# Patient Record
Sex: FEMALE | Race: WHITE | Employment: FULL TIME | URBAN - METROPOLITAN AREA
[De-identification: names, ages, dates, MRNs, and addresses within clinical notes are randomized per-mention and may not be internally consistent; named-entity substitution may affect disease eponyms.]

---

## 2020-06-01 ENCOUNTER — TELEPHONE (OUTPATIENT)
Dept: ENDOCRINOLOGY | Age: 40
End: 2020-06-01

## 2020-06-08 NOTE — TELEPHONE ENCOUNTER
Pt called and stated she mailed in NP paperwork last week but received another packet this past Friday (6/5/2020). Pt would like to know if her NP paperwork has been received yet.

## 2020-06-09 ENCOUNTER — TELEPHONE (OUTPATIENT)
Dept: ENDOCRINOLOGY | Age: 40
End: 2020-06-09

## 2020-06-19 ENCOUNTER — OFFICE VISIT (OUTPATIENT)
Dept: ENDOCRINOLOGY | Age: 40
End: 2020-06-19
Payer: COMMERCIAL

## 2020-06-19 VITALS
BODY MASS INDEX: 17.48 KG/M2 | SYSTOLIC BLOOD PRESSURE: 115 MMHG | WEIGHT: 102.4 LBS | DIASTOLIC BLOOD PRESSURE: 77 MMHG | HEIGHT: 64 IN | HEART RATE: 72 BPM | OXYGEN SATURATION: 99 %

## 2020-06-19 PROBLEM — M89.9 DISEASE OF SKELETAL SYSTEM: Status: ACTIVE | Noted: 2020-06-19

## 2020-06-19 PROBLEM — N20.0 KIDNEY STONE: Status: ACTIVE | Noted: 2020-06-19

## 2020-06-19 PROCEDURE — 99205 OFFICE O/P NEW HI 60 MIN: CPT | Performed by: INTERNAL MEDICINE

## 2020-06-19 RX ORDER — FAMOTIDINE 40 MG/1
TABLET, FILM COATED ORAL
COMMUNITY

## 2020-07-08 DIAGNOSIS — N20.0 KIDNEY STONE: ICD-10-CM

## 2020-07-08 LAB
24HR URINE VOLUME (ML): 1600 ML
CALCIUM 24 HOUR URINE: 144 MG/24 HR (ref 42–353)
CREATININE 24 HOUR URINE: 1 G/24HR (ref 0.6–1.5)
SODIUM 24 HOUR URINE: 86 MMOL/24 HR (ref 40–220)
URIC ACID 24 HOUR URINE: 358 MG/24 HR (ref 150–990)

## 2020-07-10 LAB
CITRIC ACID, U, 24HR: 635 MG/D (ref 320–1240)
CITRIC ACID, URINE: 397 MG/L
CITRIC ACID/CREATININE RATIO URINE: 722 MG/G
CREATININE 24 HOUR URINE: 880 MG/D (ref 700–1600)
CREATININE URINE: 55 MG/DL
HOURS COLLECTED: 24
OXALATES, URINE 24HR: 26 MG/D (ref 13–40)
OXALATES, URINE: 16 MG/L
URINE TOTAL VOLUME: 1600

## 2020-07-27 ENCOUNTER — TELEPHONE (OUTPATIENT)
Dept: ENDOCRINOLOGY | Age: 40
End: 2020-07-27

## 2024-06-19 ENCOUNTER — TELEPHONE (OUTPATIENT)
Dept: ENDOCRINOLOGY | Age: 44
End: 2024-06-19

## 2024-06-19 NOTE — TELEPHONE ENCOUNTER
Pt called in and said she's still on estrogen but had a dexa with sofie 4-22-24 and was told to follow up with us. Would you like her to schedule?

## 2024-06-20 NOTE — TELEPHONE ENCOUNTER
I've looked at her recent test results which have change little or not at all since 2020.  Happy to see her but I could only tell her things look fine. Suggest she ask her doctor to call me if they have questions or concerns.

## 2024-06-26 ENCOUNTER — PATIENT MESSAGE (OUTPATIENT)
Dept: ENDOCRINOLOGY | Age: 44
End: 2024-06-26